# Patient Record
Sex: FEMALE | Race: WHITE | NOT HISPANIC OR LATINO | URBAN - METROPOLITAN AREA
[De-identification: names, ages, dates, MRNs, and addresses within clinical notes are randomized per-mention and may not be internally consistent; named-entity substitution may affect disease eponyms.]

---

## 2018-11-30 ENCOUNTER — OFFICE VISIT (OUTPATIENT)
Dept: URBAN - METROPOLITAN AREA CLINIC 33 | Facility: CLINIC | Age: 63
End: 2018-11-30
Payer: MEDICAID

## 2018-11-30 DIAGNOSIS — H04.123 DRY EYE SYNDROME OF BILATERAL LACRIMAL GLANDS: ICD-10-CM

## 2018-11-30 DIAGNOSIS — H25.13 AGE-RELATED NUCLEAR CATARACT, BILATERAL: ICD-10-CM

## 2018-11-30 PROCEDURE — 99214 OFFICE O/P EST MOD 30 MIN: CPT | Performed by: OPTOMETRIST

## 2018-11-30 PROCEDURE — 99204 OFFICE O/P NEW MOD 45 MIN: CPT | Performed by: OPTOMETRIST

## 2018-11-30 PROCEDURE — 92134 CPTRZ OPH DX IMG PST SGM RTA: CPT | Performed by: OPTOMETRIST

## 2018-11-30 ASSESSMENT — INTRAOCULAR PRESSURE
OD: 17
OS: 18

## 2018-11-30 NOTE — IMPRESSION/PLAN
Impression: Other long term (current) drug therapy: Z79.899. Plan: Educated patient about the possible ocular side effects of long-term systemic use of hydroxychloroquine medication. Patient understands the risk of developing macular toxicity from long-term use of medication. Recommend mac-OCT OU and HVF 10-2 (white stimulus) in addition to dilated eye examination to monitor risk of developing maculopathy. No signs of macular toxicity at this time.  MAC OCT ordered

## 2020-10-26 ENCOUNTER — OFFICE VISIT (OUTPATIENT)
Dept: URBAN - METROPOLITAN AREA CLINIC 33 | Facility: CLINIC | Age: 65
End: 2020-10-26
Payer: MEDICAID

## 2020-10-26 DIAGNOSIS — Z79.899 OTHER LONG TERM (CURRENT) DRUG THERAPY: Primary | ICD-10-CM

## 2020-10-26 PROCEDURE — 99214 OFFICE O/P EST MOD 30 MIN: CPT | Performed by: OPTOMETRIST

## 2020-10-26 ASSESSMENT — INTRAOCULAR PRESSURE
OS: 18
OD: 18

## 2020-10-26 NOTE — IMPRESSION/PLAN
Impression: Other long term (current) drug therapy: Z79.899. Plan: Educated patient about the possible ocular side effects of long-term systemic use of hydroxychloroquine medication. Patient understands the risk of developing macular toxicity from long-term use of medication. No signs of macular toxicity at this time.